# Patient Record
Sex: FEMALE | HISPANIC OR LATINO | Employment: OTHER | URBAN - METROPOLITAN AREA
[De-identification: names, ages, dates, MRNs, and addresses within clinical notes are randomized per-mention and may not be internally consistent; named-entity substitution may affect disease eponyms.]

---

## 2019-06-18 ENCOUNTER — OFFICE VISIT (OUTPATIENT)
Dept: ORTHOPEDIC SURGERY | Facility: CLINIC | Age: 74
End: 2019-06-18

## 2019-06-18 VITALS — TEMPERATURE: 98.1 F | WEIGHT: 162 LBS

## 2019-06-18 DIAGNOSIS — G89.29 CHRONIC LOW BACK PAIN WITHOUT SCIATICA, UNSPECIFIED BACK PAIN LATERALITY: Primary | ICD-10-CM

## 2019-06-18 DIAGNOSIS — M54.50 CHRONIC LOW BACK PAIN WITHOUT SCIATICA, UNSPECIFIED BACK PAIN LATERALITY: Primary | ICD-10-CM

## 2019-06-18 PROCEDURE — 99203 OFFICE O/P NEW LOW 30 MIN: CPT | Performed by: ORTHOPAEDIC SURGERY

## 2019-06-18 RX ORDER — LOSARTAN POTASSIUM 50 MG/1
50 TABLET ORAL DAILY
Refills: 0 | COMMUNITY
Start: 2019-05-31

## 2019-06-18 RX ORDER — ATORVASTATIN CALCIUM 80 MG/1
TABLET, FILM COATED ORAL
Refills: 1 | COMMUNITY
Start: 2019-04-30

## 2019-06-18 RX ORDER — RANITIDINE 150 MG/1
150 TABLET ORAL 2 TIMES DAILY
Refills: 2 | COMMUNITY
Start: 2019-06-07

## 2019-06-18 NOTE — PROGRESS NOTES
New patient or new problem visit    Chief Complaint   Patient presents with   • Cervical Spine - Pain   • Thoracic Spine - Pain   • Lumbar Spine - Pain       HPI: She is complaining of chronic low back pain which sometimes radiates to the legs and knees.  She had no specific treatment but was ordered for physical therapy in [place] but has since moved here.  The pain is severe constant stabbing worse with activity.  No bowel or bladder complaints.    PFSH: See chart- reviewed    Review of Systems   Constitutional: Negative for chills, fever and unexpected weight change.   HENT: Negative for trouble swallowing and voice change.    Eyes: Negative for visual disturbance.   Respiratory: Negative for cough and shortness of breath.    Cardiovascular: Negative for chest pain and leg swelling.   Gastrointestinal: Negative for abdominal pain, nausea and vomiting.   Endocrine: Negative for cold intolerance and heat intolerance.   Genitourinary: Negative for difficulty urinating, frequency and urgency.   Musculoskeletal: Positive for back pain and joint swelling.   Skin: Negative for rash and wound.   Allergic/Immunologic: Negative for immunocompromised state.   Neurological: Positive for dizziness and headaches. Negative for weakness and numbness.   Hematological: Does not bruise/bleed easily.   Psychiatric/Behavioral: Negative for dysphoric mood. The patient is not nervous/anxious.        PE: She is seen today with an  who seems medically knowledgeable.  Constitutional: Vital signs above-noted.  Awake, alert and oriented    Psychiatric: Affect and insight do not appear grossly disturbed.    Pulmonary: Breathing is unlabored, color is good.    Skin: Warm, dry and normal turgor    Cardiac: Pedal pulses intact.  No edema.    Eyesight and hearing appear adequate for examination purposes      Musculoskeletal:  There is minimal tenderness to percussion and palpation of the spine. Motion appears undisturbed.  Posture  is unremarkable to coronal and sagittal inspection.    The skin about the area is intact.  There is no palpable or visible deformity.  There is no local spasm.       Neurologic:   Reflexes are 2+ and symmetrical in the patellae and absent in the Achilles.   Motor function is undisturbed in quadriceps, EHL, and gastrocnemius   sensation appears symmetrically intact to light touch.  In the bilateral lower extremities there is no evidence of atrophy.   Clonus is absent..  Gait appears undisturbed.  SLR test negative      MEDICAL DECISION MAKING    XRAY: Plain film x-rays show a slight well-balanced scoliosis and good sagittal posture and otherwise fairly normal-appearing x-ray some overlying soft tissue artifact obscures detailed visualization.  No comparison views are available.    Other: n/a    Impression: Lumbar back pain and scoliosis, not necessarily related    Plan: I have recommended physical therapy and follow-up visit as needed.

## 2019-07-31 ENCOUNTER — OFFICE VISIT (OUTPATIENT)
Dept: ORTHOPEDIC SURGERY | Facility: CLINIC | Age: 74
End: 2019-07-31

## 2019-07-31 VITALS — TEMPERATURE: 98.2 F | WEIGHT: 160 LBS

## 2019-07-31 DIAGNOSIS — M54.2 NECK PAIN: ICD-10-CM

## 2019-07-31 DIAGNOSIS — M54.50 CHRONIC LOW BACK PAIN WITHOUT SCIATICA, UNSPECIFIED BACK PAIN LATERALITY: Primary | ICD-10-CM

## 2019-07-31 DIAGNOSIS — G89.29 CHRONIC LOW BACK PAIN WITHOUT SCIATICA, UNSPECIFIED BACK PAIN LATERALITY: Primary | ICD-10-CM

## 2019-07-31 PROCEDURE — 99213 OFFICE O/P EST LOW 20 MIN: CPT | Performed by: ORTHOPAEDIC SURGERY

## 2019-08-01 NOTE — PROGRESS NOTES
She continues to complain of neck and back pain.  No balance issues.  I saw her last month, but she has not been to therapy.  I do not know if there was a logistical issue or why not.  In any event good strength in the upper lower extremity some neck and back tenderness.  The exam and interview is conducted with the assistance of .  Plan physical therapy for the neck and back and follow-up as needed.

## 2019-09-05 ENCOUNTER — TELEPHONE (OUTPATIENT)
Dept: ORTHOPEDIC SURGERY | Facility: CLINIC | Age: 74
End: 2019-09-05

## 2019-09-05 DIAGNOSIS — M54.50 CHRONIC LOW BACK PAIN WITHOUT SCIATICA, UNSPECIFIED BACK PAIN LATERALITY: Primary | ICD-10-CM

## 2019-09-05 DIAGNOSIS — G89.29 CHRONIC LOW BACK PAIN WITHOUT SCIATICA, UNSPECIFIED BACK PAIN LATERALITY: Primary | ICD-10-CM

## 2019-10-14 ENCOUNTER — TELEPHONE (OUTPATIENT)
Dept: ORTHOPEDIC SURGERY | Facility: CLINIC | Age: 74
End: 2019-10-14

## 2019-10-14 DIAGNOSIS — M54.40 CHRONIC LOW BACK PAIN WITH SCIATICA, SCIATICA LATERALITY UNSPECIFIED, UNSPECIFIED BACK PAIN LATERALITY: Primary | ICD-10-CM

## 2019-10-14 DIAGNOSIS — G89.29 CHRONIC LOW BACK PAIN WITH SCIATICA, SCIATICA LATERALITY UNSPECIFIED, UNSPECIFIED BACK PAIN LATERALITY: Primary | ICD-10-CM
